# Patient Record
Sex: FEMALE | Race: BLACK OR AFRICAN AMERICAN
[De-identification: names, ages, dates, MRNs, and addresses within clinical notes are randomized per-mention and may not be internally consistent; named-entity substitution may affect disease eponyms.]

---

## 2021-01-15 ENCOUNTER — HOSPITAL ENCOUNTER (EMERGENCY)
Dept: HOSPITAL 61 - ER | Age: 26
Discharge: HOME | End: 2021-01-15
Payer: SELF-PAY

## 2021-01-15 VITALS — BODY MASS INDEX: 27.48 KG/M2 | HEIGHT: 64 IN | WEIGHT: 160.94 LBS

## 2021-01-15 VITALS — DIASTOLIC BLOOD PRESSURE: 81 MMHG | SYSTOLIC BLOOD PRESSURE: 126 MMHG

## 2021-01-15 DIAGNOSIS — M54.2: ICD-10-CM

## 2021-01-15 DIAGNOSIS — M25.512: Primary | ICD-10-CM

## 2021-01-15 DIAGNOSIS — I10: ICD-10-CM

## 2021-01-15 DIAGNOSIS — F17.200: ICD-10-CM

## 2021-01-15 DIAGNOSIS — Z98.890: ICD-10-CM

## 2021-01-15 PROCEDURE — 73030 X-RAY EXAM OF SHOULDER: CPT

## 2021-01-15 PROCEDURE — 99283 EMERGENCY DEPT VISIT LOW MDM: CPT

## 2021-01-15 PROCEDURE — A4565 SLINGS: HCPCS

## 2021-01-15 NOTE — RAD
AP Internal and external rotation views with Y-View of the left shoulder were performed.



Indication: Pain for 5 days after injury 

 

Comparison:  None. 

 

No fracture, glenohumeral or AC joint subluxation, or significant degenerative changes are seen.  The
 subacromial space is maintained.

 

Impression:

1. Unremarkable exam of the left shoulder.





Electronically signed by: Bassem Arellano MD (1/15/2021 2:47 PM) [id number]

## 2021-01-15 NOTE — PHYS DOC
Past Medical History


Past Medical History:  Hypertension, Other


Additional Past Medical Histor:  Tachycardia


 (AMISH STERLING APRN)


Past Surgical History:  Other


Additional Past Surgical Histo:  [place] teeth removal, R knee arthroscopy, 

DEVIATED SEPTUM REPAIR


 (AMISH STERLING APRN)


Smoking Status:  Current Every Day Smoker


Alcohol Use:  Occasionally


Drug Use:  None


 (AMISH STERLING)





General Adult


EDM:


Chief Complaint:  SHOULDER INJURY





HPI:


HPI:





Patient is a 25  year old female who presents with complaining of 1 week of left

shoulder sharp shooting pain down the arm and tingling in the fingers.  She 

states she is only in pain with movement of the shoulder and her range of motion

is decreased.  Patient states she has not been using anything for pain.  Patient

has a history of hypertension, tachycardia, wisdom teeth removal, smoker.


 (AMISH STERLING APRN)





Review of Systems:


Review of Systems:


Constitutional:   Denies fever or chills. []


Eyes:   Denies change in visual acuity. []


HENT:   Denies nasal congestion or sore throat. [] 


Respiratory:   Denies cough or shortness of breath. [] 


Cardiovascular:   Denies chest pain or edema. [] 


GI:   Denies abdominal pain, nausea, vomiting, bloody stools or diarrhea. [] 


:  Denies dysuria. [] 


Musculoskeletal:   Denies back pain. + Left shoulder joint pain. [] 


Integument:   Denies rash. [] 


Neurologic:   Denies headache, focal weakness or sensory changes. + Tingling in 

fingers [] 


Endocrine:   Denies polyuria or polydipsia. [] 


Lymphatic:  Denies swollen glands. [] 


Psychiatric:  Denies depression or anxiety. []


 (AMISH STERLING APRN)





Heart Score:


Risk Factors:


Risk Factors:  DM, Current or recent (<one month) smoker, HTN, HLP, family 

history of CAD, obesity.


Risk Scores:


Score 0 - 3:  2.5% MACE over next 6 weeks - Discharge Home


Score 4 - 6:  20.3% MACE over next 6 weeks - Admit for Clinical Observation


Score 7 - 10:  72.7% MACE over next 6 weeks - Early Invasive Strategies


 (AMISH STERLING APRN)





Allergies:


Allergies:





Allergies








Coded Allergies Type Severity Reaction Last Updated Verified


 


  No Known Drug Allergies    13 No








 (BAFHERNAN ALEJANDRAA  APRN)





Physical Exam:


PE:





Constitutional: Well developed, well nourished, no acute distress, non-toxic 

appearance. []


HENT: Normocephalic, atraumatic, bilateral external ears normal, oropharynx 

moist, no oral exudates, nose normal. []


Eyes: PERRLA, EOMI, conjunctiva normal, no discharge. [] 


Neck: Normal range of motion, no tenderness, supple, no stridor. [] 


Cardiovascular:Heart rate regular rhythm, no murmur []


Lungs & Thorax:  Bilateral breath sounds clear to auscultation []


Abdomen: Bowel sounds normal, soft, no tenderness, no masses, no pulsatile 

masses. [] 


Skin: Warm, dry, no erythema, no rash. [] 


Back: No tenderness, no CVA tenderness. [] 


Extremities: Left deltoid tenderness, no cyanosis, no clubbing, left shoulder 

ROM limited but intact, no edema. [] 


Neurologic: Alert and oriented X 3, normal motor function, normal sensory 

function, no focal deficits noted. []


Psychologic: Affect normal, judgement normal, mood normal. []


 [facility]





Current Patient Data:


Vital Signs:





                                   Vital Signs








  Date Time  Temp Pulse Resp B/P (MAP) Pulse Ox O2 Delivery O2 Flow Rate FiO2


 


1/15/21 13:56 97.3 73  134/87 (103)  Room Air  





 97.3       








 [facility]





EKG:


EKG:


[]


 [facility]





Radiology/Procedures:


Radiology/Procedures:


[]


Impression:


                            [facility]


                    [address] [place] [address] [phone]


                                        


                                 IMAGING REPORT





                                     Signed





PATIENT: KELLI CEBALLOS LACCOUNT: [id number]     MRN#: [id number]


: 1995           LOCATION: ER              AGE: 25


SEX: F                    EXAM DT: 01/15/21         ACCESSION#: [id number]


STATUS: PRE ER            ORD. PHYSICIAN: BEATRIZ BRIDGES DO


REASON: PAIN X 5 DAYS S/P INJURY


PROCEDURE: SHOULDER 2+V LEFT





AP Internal and external rotation views with Y-View of the left shoulder were 

performed.





Indication: Pain for 5 days after injury 


 


Comparison:  None. 


 


No fracture, glenohumeral or AC joint subluxation, or significant degenerative 

changes are seen.  The subacromial space is maintained.


 


Impression:


1. Unremarkable exam of the left shoulder.








Electronically signed by: Bassem Arellano MD (1/15/2021 2:47 PM) [id number]














DICTATED and SIGNED BY:     BASSEM ARELLANO MD


DATE:     01/15/21 [id number]


 (AMISH STERLING)





Course & Med Decision Making:


Course & Med Decision Making


Pertinent Labs and Imaging studies reviewed. (See chart for details)





See HPI.  Range of motion the left shoulder is limited due to pain.  There is no

 swelling, redness, heat, deformity to the left shoulder or the extremity.  

There is no joint laxity.  There is no focal weakness.  Strong  and 

strengths.  Tenderness to the deltoid with palpation.  Patient states that all 

she can think of is that at work she was picking up and throwing snowballs 

trying to hit a pole.  She states she may have strained it.  Patient is placed 

in an arm sling.


[]


 (AMISH STERLING)





Dragon Disclaimer:


Dragon Disclaimer:


This electronic medical record was generated, in whole or in part, using a voice

 recognition dictation system.


 (AMISH STERLING)





Departure


Departure


Impression:  


   Primary Impression:  


   Cervical radicular pain


Disposition:  01 DC HOME SELF CARE/HOMELESS


Condition:  STABLE


Referrals:  


NO PCP (PCP)








SHAHBAZ YANES MD


Patient Instructions:  Cervical Radiculopathy





Additional Instructions:  


Take medications as prescribed and with food.  Remember to not drive or drink 

alcohol or work on the muscle relaxer as it would make you sleepy.  Use a 

heating pad.  Remember ending up with the eating pad over the lidocaine patch.


Scripts


Ibuprofen (IBUPROFEN) 600 Mg Tablet


600 MG PO PRN Q6HRS PRN for INFLAMMATION, #25 TAB


   Prov: AMISH STERLING         1/15/21 


Cyclobenzaprine Hcl (CYCLOBENZAPRINE HCL) 5 Mg Tablet


1 TAB PO TID, #21 TAB


   Prov: AMISH STERLING         1/15/21 


Methylprednisolone (MEDROL) 4 Mg Tab.ds.pk


1 PKG PO UD, #1 PKG


   Prov: AMISH STERLING         1/15/21





Attending Signature


Attending Signature


I have reviewed the PA/NP's note and plan of care. I was available for 

consultation as needed during the patient's visit in the emergency department. I

 agree with the clinical impression, plan, and disposition.


 (BEATRIZ BRIDGES DO)











AMISH STERLING            Chepe 15, 2021 15:13


BEATRIZ BRIDGES DO             2021 18:54

## 2021-05-23 ENCOUNTER — HOSPITAL ENCOUNTER (EMERGENCY)
Dept: HOSPITAL 61 - ER | Age: 26
Discharge: HOME | End: 2021-05-23
Payer: SELF-PAY

## 2021-05-23 VITALS — HEIGHT: 64 IN | BODY MASS INDEX: 26.72 KG/M2 | WEIGHT: 156.53 LBS

## 2021-05-23 VITALS — SYSTOLIC BLOOD PRESSURE: 151 MMHG | DIASTOLIC BLOOD PRESSURE: 80 MMHG

## 2021-05-23 DIAGNOSIS — I10: ICD-10-CM

## 2021-05-23 DIAGNOSIS — N76.4: Primary | ICD-10-CM

## 2021-05-23 DIAGNOSIS — F17.200: ICD-10-CM

## 2021-05-23 PROCEDURE — 56405 I&D VULVA/PERINEAL ABSCESS: CPT

## 2021-05-23 PROCEDURE — 99284 EMERGENCY DEPT VISIT MOD MDM: CPT

## 2021-05-23 NOTE — ED.ADGEN
Past Medical History


Past Medical History:  Hypertension, Other


Additional Past Medical Histor:  Tachycardia


Past Surgical History:  Other


Additional Past Surgical Histo:  [place] teeth removal, R knee arthroscopy, 

DEVIATED SEPTUM REPAIR


Smoking Status:  Current Every Day Smoker


Alcohol Use:  Occasionally


Drug Use:  None





General Adult


EDM:


Chief Complaint:  SKIN RASH/ABSCESS





HPI:


HPI:





Patient is a 26  year old female coming in for a "boil" in her right groin area.

 Patient states she has had similar lesions in the past that went away 

spontaneously.  Patient states she shaves and thinks she aggravated the area by 

wearing tight pants when she was driving a long distance.  No systemic 

complaints.





Review of Systems:


Review of Systems:


All other systems within normal limits except for as noted in the HPI





Current Medications:





Current Medications








 Medications


  (Trade)  Dose


 Ordered  Sig/Winnie  Start Time


 Stop Time Status Last Admin


Dose Admin


 


 Lidocaine/


 Epinephrine


  (LIDOCAINE


 2%-EPI 1:100,000


 multi-dose)  20 ml  1X  ONCE  5/23/21 03:30


 5/23/21 03:31  5/23/21 03:01


20 ML











Allergies:


Allergies:





Allergies








Coded Allergies Type Severity Reaction Last Updated Verified


 


  No Known Drug Allergies    12/20/13 No











Physical Exam:


PE:


Constitutional: Well developed, well nourished, no acute distress, non-toxic 

appearance. []


HENT: Normocephalic, atraumatic, bilateral external ears normal,  nose normal. 

[]


Eyes: PERRLA, conjunctiva normal, no discharge. [] 


Neck: No rigidity, supple, no stridor. [] 


Cardiovascular: Regular rate and rhythm, brisk cap refill []


Lungs & Thorax: Non labored symmetric respirations, no tachypnea or respiratory 

distress []


Abdomen: Soft, nondistended.


Skin: Warm, dry, no erythema, no rash.  1.5 by 2 cm abscess increased between 

the majora and thigh [] 


Back: Unremarkable


Extremities: No deformities, range of motion grossly intact, no lower extremity 

edema [] 


Neurologic: Alert and oriented X 3, no focal deficits noted. []


Psychologic: Affect normal, judgement normal, mood normal. []





EKG:


EKG:


[]





Heart Score:


C/O Chest Pain:  No


Risk Factors:


Risk Factors:  DM, Current or recent (<one month) smoker, HTN, HLP, family 

history of CAD, obesity.


Risk Scores:


Score 0 - 3:  2.5% MACE over next 6 weeks - Discharge Home


Score 4 - 6:  20.3% MACE over next 6 weeks - Admit for Clinical Observation


Score 7 - 10:  72.7% MACE over next 6 weeks - Early Invasive Strategies





Radiology/Procedures:


Radiology/Procedures:


I&D procedure note: 2 cc of 2% lidocaine with epi injected in and around 

abscess.  11 blade scalpel used to make a 1 x 1 cm cruciate incision.  Copious 

purulent drainage removed.  Wound irrigated with saline wound dressed with 

nonadherent dressing.  []





Course & Med Decision Making:


Course & Med Decision Making


Pertinent Labs and Imaging studies reviewed. (See chart for details)





[]





Dragon Disclaimer:


Dragon Disclaimer:


This electronic medical record was generated, in whole or in part, using a voice

 recognition dictation system.





Departure


Departure


Impression:  


   Primary Impression:  


   Abscess of labia majora


Disposition:  01 HOME / SELF CARE / HOMELESS


Condition:  STABLE


Referrals:  


NO PCP (PCP)


Patient Instructions:  Incision and Drainage, Care After


Scripts


Clindamycin Hcl (CLINDAMYCIN HCL) 300 Mg Capsule


1 CAP PO QID for antibiotic for 7 Days, #28 CAP


   Prov: DAVID JUNIOR MD         5/23/21











DAVID JUNIOR MD            May 23, 2021 03:19

## 2021-06-23 ENCOUNTER — HOSPITAL ENCOUNTER (EMERGENCY)
Dept: HOSPITAL 61 - ER | Age: 26
Discharge: HOME | End: 2021-06-23
Payer: SELF-PAY

## 2021-06-23 VITALS — HEIGHT: 64 IN | BODY MASS INDEX: 25.63 KG/M2 | WEIGHT: 150.13 LBS

## 2021-06-23 VITALS — SYSTOLIC BLOOD PRESSURE: 131 MMHG | DIASTOLIC BLOOD PRESSURE: 90 MMHG

## 2021-06-23 DIAGNOSIS — M25.521: Primary | ICD-10-CM

## 2021-06-23 DIAGNOSIS — I10: ICD-10-CM

## 2021-06-23 DIAGNOSIS — F17.200: ICD-10-CM

## 2021-06-23 PROCEDURE — 99282 EMERGENCY DEPT VISIT SF MDM: CPT

## 2021-06-23 NOTE — PHYS DOC
Past Medical History


Past Medical History:  Hypertension, Other


Additional Past Medical Histor:  Tachycardia


Past Surgical History:  Other


Additional Past Surgical Histo:  [place] teeth removal, R knee arthroscopy, 

DEVIATED SEPTUM REPAIR


Smoking Status:  Current Every Day Smoker


Alcohol Use:  Occasionally


Drug Use:  None





General Adult


EDM:


Chief Complaint:  ELBOW PROBLEM





HPI:


HPI:





Patient is a 26  year old female with history of hypertension who presents the 

ED today complaining of mild to moderate intermittent right elbow pain that has 

been going on since February.  She states back in February 2021 she fell, 

dislocated as well as fractured her elbow.  She states she was seen at [facility] and was referred to an orthopedic doctor who she saw.  

She states the orthopedic doctor wanted to do "scans" but she does not have 

insurance and was told there will not be done.  Patient states she lives in 

pain.  Patient is very upset, she states the nurse who triaged her requested her

to follow-up with a clinic or an orthopedic doctor.  At this point she is 

requesting to be discharged.





Of note she is using her right upper extremity texting with no difficulties





Review of Systems:


Review of Systems:


Constitutional:   Denies fever or chills. []


Musculoskeletal: Reports right elbow pain


Integument:   Denies rash. [] 


Neurologic:   Denies headache, focal weakness or sensory changes. [] 


Psychiatric:  Denies depression or anxiety. []





Heart Score:


C/O Chest Pain:  N/A


Risk Factors:


Risk Factors:  DM, Current or recent (<one month) smoker, HTN, HLP, family 

history of CAD, obesity.


Risk Scores:


Score 0 - 3:  2.5% MACE over next 6 weeks - Discharge Home


Score 4 - 6:  20.3% MACE over next 6 weeks - Admit for Clinical Observation


Score 7 - 10:  72.7% MACE over next 6 weeks - Early Invasive Strategies





Allergies:


Allergies:





Allergies








Coded Allergies Type Severity Reaction Last Updated Verified


 


  No Known Drug Allergies    12/20/13 No











Physical Exam:


PE:





Constitutional: Well developed, well nourished, no acute distress, non-toxic 

appearance. []


Skin: Warm, dry, no erythema, no rash. [] 


Back: No tenderness, no CVA tenderness. [] 


Extremities: No tenderness, no cyanosis, no clubbing, ROM intact, no edema. [] 


Neurologic: Alert and oriented X 3, normal motor function, normal sensory 

function, no focal deficits noted. []


Psychologic: Affect normal, judgement normal, mood normal. []





EKG:


EKG:


[]





Radiology/Procedures:


Radiology/Procedures:


[]





Course & Med Decision Making:


Course & Med Decision Making


Pertinent Labs and Imaging studies reviewed. (See chart for details)





This is a 26-year-old female patient presented to the ED today with right elbow 

pain that began in February after she fell and dislocated as well as fractured 

the elbow.  She was seen at [facility] and followed up with 

an orthopedic doctor who wanted to do further scans but she had no medical 

insurance since they were not done.  She states she now lives in pain.  She is 

currently upset requesting to be discharged to home because she was informed by 

one of the nurses that she needs to followed up by an orthopedic doctor or one 

of the local clinics.  Report was given to her.  Provided orthopedic for follow-

up.





Dragon Disclaimer:


Dragon Disclaimer:


This electronic medical record was generated, in whole or in part, using a voice

 recognition dictation system.





Departure


Departure


Impression:  


   Primary Impression:  


   Right elbow pain


Disposition:  01 HOME / SELF CARE / HOMELESS


Condition:  STABLE


Referrals:  


NO PCP (PCP)








TERRI VILLALOBOS MD


follow up in one week


Patient Instructions:  Elbow Injury





Additional Instructions:  


Follow up with the orthopedic doctor or one of the local  clinics





Take over the counter pain relievers eg. Naproxen or Tylenol











GEMMA ARGUETA            Jun 23, 2021 22:32